# Patient Record
Sex: FEMALE | Race: WHITE | URBAN - METROPOLITAN AREA
[De-identification: names, ages, dates, MRNs, and addresses within clinical notes are randomized per-mention and may not be internally consistent; named-entity substitution may affect disease eponyms.]

---

## 2019-03-15 ENCOUNTER — TRANSFERRED RECORDS (OUTPATIENT)
Dept: HEALTH INFORMATION MANAGEMENT | Facility: CLINIC | Age: 49
End: 2019-03-15

## 2019-03-15 LAB
ALT SERPL-CCNC: 39 U/L (ref 13–69)
AST SERPL-CCNC: 25 U/L (ref 12–35)
CREAT SERPL-MCNC: 0.6 MG/DL (ref 0.5–1.5)
GFR SERPL CREATININE-BSD FRML MDRD: 104.3 ML/MIN
GLUCOSE SERPL-MCNC: 94 MG/DL (ref 60–115)
INR PPP: 0.9 (ref 0.9–1.1)
POTASSIUM SERPL-SCNC: 4.2 MMOL/L (ref 3.6–5.1)
TSH SERPL-ACNC: 3.85 UIU/ML (ref 0.27–4.2)

## 2019-03-19 ENCOUNTER — HOSPITAL ENCOUNTER (EMERGENCY)
Facility: CLINIC | Age: 49
Discharge: HOME | End: 2019-03-19
Attending: INTERNAL MEDICINE | Admitting: INTERNAL MEDICINE
Payer: COMMERCIAL

## 2019-03-19 VITALS
HEIGHT: 65 IN | WEIGHT: 226.85 LBS | RESPIRATION RATE: 16 BRPM | HEART RATE: 72 BPM | BODY MASS INDEX: 37.8 KG/M2 | OXYGEN SATURATION: 99 % | TEMPERATURE: 99 F | SYSTOLIC BLOOD PRESSURE: 126 MMHG | DIASTOLIC BLOOD PRESSURE: 71 MMHG

## 2019-03-19 DIAGNOSIS — R51.9 NONINTRACTABLE HEADACHE, UNSPECIFIED CHRONICITY PATTERN, UNSPECIFIED HEADACHE TYPE: ICD-10-CM

## 2019-03-19 DIAGNOSIS — R53.81 MALAISE: ICD-10-CM

## 2019-03-19 LAB
ALBUMIN SERPL-MCNC: 3.4 G/DL (ref 3.4–5)
ALBUMIN UR-MCNC: NEGATIVE MG/DL
ALP SERPL-CCNC: 148 U/L (ref 40–150)
ALT SERPL W P-5'-P-CCNC: 38 U/L (ref 0–50)
ANION GAP SERPL CALCULATED.3IONS-SCNC: 5 MMOL/L (ref 3–14)
APPEARANCE UR: CLEAR
AST SERPL W P-5'-P-CCNC: 33 U/L (ref 0–45)
BASOPHILS # BLD AUTO: 0 10E9/L (ref 0–0.2)
BASOPHILS NFR BLD AUTO: 0.3 %
BILIRUB SERPL-MCNC: 0.3 MG/DL (ref 0.2–1.3)
BILIRUB UR QL STRIP: NEGATIVE
BUN SERPL-MCNC: 11 MG/DL (ref 7–30)
CALCIUM SERPL-MCNC: 8.6 MG/DL (ref 8.5–10.1)
CHLORIDE SERPL-SCNC: 112 MMOL/L (ref 94–109)
CO2 SERPL-SCNC: 24 MMOL/L (ref 20–32)
COLOR UR AUTO: ABNORMAL
CREAT SERPL-MCNC: 0.73 MG/DL (ref 0.52–1.04)
DIFFERENTIAL METHOD BLD: NORMAL
EOSINOPHIL # BLD AUTO: 0.1 10E9/L (ref 0–0.7)
EOSINOPHIL NFR BLD AUTO: 0.7 %
ERYTHROCYTE [DISTWIDTH] IN BLOOD BY AUTOMATED COUNT: 12.8 % (ref 10–15)
GFR SERPL CREATININE-BSD FRML MDRD: >90 ML/MIN/{1.73_M2}
GLUCOSE SERPL-MCNC: 87 MG/DL (ref 70–99)
GLUCOSE UR STRIP-MCNC: NEGATIVE MG/DL
HCT VFR BLD AUTO: 41.8 % (ref 35–47)
HGB BLD-MCNC: 13.2 G/DL (ref 11.7–15.7)
HGB UR QL STRIP: NEGATIVE
IMM GRANULOCYTES # BLD: 0 10E9/L (ref 0–0.4)
IMM GRANULOCYTES NFR BLD: 0.3 %
INTERPRETATION ECG - MUSE: NORMAL
KETONES UR STRIP-MCNC: NEGATIVE MG/DL
LEUKOCYTE ESTERASE UR QL STRIP: NEGATIVE
LYMPHOCYTES # BLD AUTO: 1.6 10E9/L (ref 0.8–5.3)
LYMPHOCYTES NFR BLD AUTO: 24.2 %
MAGNESIUM SERPL-MCNC: 2.3 MG/DL (ref 1.6–2.3)
MCH RBC QN AUTO: 29.8 PG (ref 26.5–33)
MCHC RBC AUTO-ENTMCNC: 31.6 G/DL (ref 31.5–36.5)
MCV RBC AUTO: 94 FL (ref 78–100)
MONOCYTES # BLD AUTO: 0.4 10E9/L (ref 0–1.3)
MONOCYTES NFR BLD AUTO: 5.6 %
NEUTROPHILS # BLD AUTO: 4.7 10E9/L (ref 1.6–8.3)
NEUTROPHILS NFR BLD AUTO: 68.9 %
NITRATE UR QL: NEGATIVE
NRBC # BLD AUTO: 0 10*3/UL
NRBC BLD AUTO-RTO: 0 /100
PH UR STRIP: 6 PH (ref 5–7)
PLATELET # BLD AUTO: 370 10E9/L (ref 150–450)
POTASSIUM SERPL-SCNC: 4.2 MMOL/L (ref 3.4–5.3)
PROT SERPL-MCNC: 6.3 G/DL (ref 6.8–8.8)
RBC # BLD AUTO: 4.43 10E12/L (ref 3.8–5.2)
RBC #/AREA URNS AUTO: 0 /HPF (ref 0–2)
SODIUM SERPL-SCNC: 141 MMOL/L (ref 133–144)
SOURCE: ABNORMAL
SP GR UR STRIP: 1 (ref 1–1.03)
SQUAMOUS #/AREA URNS AUTO: 2 /HPF (ref 0–1)
UROBILINOGEN UR STRIP-MCNC: 0 MG/DL (ref 0–2)
WBC # BLD AUTO: 6.8 10E9/L (ref 4–11)
WBC #/AREA URNS AUTO: <1 /HPF (ref 0–5)

## 2019-03-19 PROCEDURE — 85025 COMPLETE CBC W/AUTO DIFF WBC: CPT | Performed by: INTERNAL MEDICINE

## 2019-03-19 PROCEDURE — 80053 COMPREHEN METABOLIC PANEL: CPT | Performed by: INTERNAL MEDICINE

## 2019-03-19 PROCEDURE — 93005 ELECTROCARDIOGRAM TRACING: CPT

## 2019-03-19 PROCEDURE — 36415 COLL VENOUS BLD VENIPUNCTURE: CPT | Performed by: INTERNAL MEDICINE

## 2019-03-19 PROCEDURE — 83735 ASSAY OF MAGNESIUM: CPT | Performed by: INTERNAL MEDICINE

## 2019-03-19 PROCEDURE — 81001 URINALYSIS AUTO W/SCOPE: CPT | Performed by: INTERNAL MEDICINE

## 2019-03-19 PROCEDURE — 99284 EMERGENCY DEPT VISIT MOD MDM: CPT

## 2019-03-19 PROCEDURE — 25000132 ZZH RX MED GY IP 250 OP 250 PS 637: Performed by: INTERNAL MEDICINE

## 2019-03-19 RX ORDER — ESCITALOPRAM OXALATE 20 MG/1
20 TABLET ORAL DAILY
COMMUNITY

## 2019-03-19 RX ORDER — DEXTROAMPHETAMINE SACCHARATE, AMPHETAMINE ASPARTATE MONOHYDRATE, DEXTROAMPHETAMINE SULFATE AND AMPHETAMINE SULFATE 7.5; 7.5; 7.5; 7.5 MG/1; MG/1; MG/1; MG/1
30 CAPSULE, EXTENDED RELEASE ORAL DAILY
COMMUNITY

## 2019-03-19 RX ORDER — ACETAMINOPHEN 325 MG/1
975 TABLET ORAL ONCE
Status: COMPLETED | OUTPATIENT
Start: 2019-03-19 | End: 2019-03-19

## 2019-03-19 RX ORDER — HYDROCODONE BITARTRATE AND ACETAMINOPHEN 5; 325 MG/1; MG/1
1 TABLET ORAL EVERY 6 HOURS PRN
COMMUNITY

## 2019-03-19 RX ORDER — LEVETIRACETAM 500 MG/1
500 TABLET ORAL 2 TIMES DAILY
COMMUNITY

## 2019-03-19 RX ADMIN — ACETAMINOPHEN 975 MG: 325 TABLET, FILM COATED ORAL at 14:28

## 2019-03-19 SDOH — HEALTH STABILITY: MENTAL HEALTH: HOW OFTEN DO YOU HAVE A DRINK CONTAINING ALCOHOL?: NEVER

## 2019-03-19 ASSESSMENT — ENCOUNTER SYMPTOMS
HEADACHES: 1
ABDOMINAL PAIN: 0
FREQUENCY: 0
DIFFICULTY URINATING: 0
FATIGUE: 1
WEAKNESS: 0
MYALGIAS: 1
HEMATURIA: 0
NUMBNESS: 0
DYSURIA: 0

## 2019-03-19 ASSESSMENT — MIFFLIN-ST. JEOR: SCORE: 1664.88

## 2019-03-19 NOTE — LETTER
March 19, 2019      To Whom It May Concern:      Elizabeth Antonio was seen in our Emergency Department today, 03/19/19.  I expect her condition to improve over the next 1-2 days.  She may return to work/school when improved.    Sincerely,        Mary Bravo RN

## 2019-03-19 NOTE — ED TRIAGE NOTES
Pt was found floor of a [employer] in [place] on Friday and was seen in ED in [place].  She has headache and generalized weakness.  She was told to come to [place] if worse due to other hospital not having ability to do EEG.

## 2019-03-19 NOTE — ED AVS SNAPSHOT
[facility] Emergency Department  [address]  [facility] [address]  Phone:  [phone]  Fax:  [phone]                                    Elizabeth Antonio   MRN: [id number]    Department:  [facility] Emergency Department   Date of Visit:  3/19/2019           After Visit Summary Signature Page    I have received my discharge instructions, and my questions have been answered. I have discussed any challenges I see with this plan with the nurse or doctor.    ..........................................................................................................................................  Patient/Patient Representative Signature      ..........................................................................................................................................  Patient Representative Print Name and Relationship to Patient    ..................................................               ................................................  Date                                   Time    ..........................................................................................................................................  Reviewed by Signature/Title    ...................................................              ..............................................  Date                                               Time          22EPIC Rev 08/18

## 2019-03-19 NOTE — ED PROVIDER NOTES
"  History     Chief Complaint:  Headache    The history is provided by the patient.      Elizabeth Antonio is a 49 year old female with a history of Migraines, asthma, and depression who presents to the emergency department with her mother for evaluation of a headache. Five days ago while at [employer], she remembers getting a cart and then next was found in an asile on the floor by an employee. She then remembers waking up in am ambulance, very confused as to what had happened. She remembers she lost control of her bladder, bit her tongue, being achy from the fall, and with left sided jaw pain. She was evaluated at the [facility], where they did a chest xray, head MRI, laboratory work, and EKG (results below). She was then referred to see a neurologist through the [facility], which is scheduled for tomorrow, and was started on 500 mg Keppra BID. Since then, the patient has continued to \"feel off\", not feeing herself, achy, fatigued, and complaining of a headache, prompting to her seek evaluation sooner than her appointment. She has been eating protein shakes because of her continued sore tongue and jaw pain. She continues to have back tenderness as well as arm and leg tenderness. She is without urinary symptoms, including hematuria, abdominal pain, and numbness and weakness. She denies history of previous seizures, head injuries, or frequent headaches.     Laboratory studies:  HCG: Negative  CBC: WBC: 11.22 (H), HGB: 13.5, PLT: 412  CMP: Sodium 138, potassium 4.2, chloride 114, BUN 19, AST 25, ALT 39,, o/w WNL (Creatinine: 0.80)  TSH: 3.8  Lipase: 218  Magnesium 2.3  Total bilirubin: 0.3  UA with micro: All negative. No white cells.  Urine drug tox positive for amphetamines  Acetaminophen level less than 10.  Alcohol ethyl: <0.01  INR: 0.9  Lactic acid: 0.9    Imaging:  CT Head without contrast:   No acute intracranial hemorrhage. As per radiology.    XR Chest 2 views:   Without significant fracture, " "cardiomegaly, or infiltrate. As per radiology.     MRI Brain:  Incidental findings on the left falx of possible calcified meningioma. No associated swelling with it. No recent CVA or hemorrhage noted. As per radiology.         Allergies:  Lisinopril     Medications:    Adderall  Lexapro  Keppra    Past Medical History:    Allergic rhinitis  Depression  Asthma  Insomnia  Migraines  TMJ  Morbid obesity    Past Surgical History:    cholecystectomy  Gyn surgery  Orthopedic surgery    Family History:    Breast cancer  Heart disease: Maternal grandmother  Asthma    Social History:  Negative for tobacco use.  Negative for alcohol use.  Negative for drug use.  Patient presents with her mother.     Review of Systems   Constitutional: Positive for fatigue.   Gastrointestinal: Negative for abdominal pain.   Genitourinary: Negative for difficulty urinating, dysuria, frequency, hematuria and urgency.   Musculoskeletal: Positive for myalgias.   Neurological: Positive for headaches. Negative for weakness and numbness.   All other systems reviewed and are negative.    Physical Exam     Patient Vitals for the past 24 hrs:   BP Temp Temp src Pulse Heart Rate Resp SpO2 Height Weight   03/19/19 1550 -- -- -- -- -- -- 99 % -- --   03/19/19 1549 126/71 -- -- 72 -- -- -- -- --   03/19/19 1326 (!) 148/97 99  F (37.2  C) Oral -- 82 16 96 % 1.651 m (5' 5\") 102.9 kg (226 lb 13.7 oz)     Physical Exam   Constitutional:   Pleasant and cooperative   HENT:   Right Ear: Tympanic membrane normal.   Left Ear: Tympanic membrane normal.   Mouth/Throat: Oropharynx is clear and moist and mucous membranes are normal. No posterior oropharyngeal erythema.   Bruise right tongue   Eyes: Conjunctivae are normal.   Neck: Neck supple.   Cardiovascular: Normal rate, regular rhythm and normal heart sounds.   Pulmonary/Chest: Effort normal and breath sounds normal.   Abdominal: Soft. Bowel sounds are normal. She exhibits no distension. There is no tenderness. " There is no rebound and no guarding.   Musculoskeletal: Normal range of motion.   Tender over back muscles   Neurological: She is alert.   Skin: Skin is warm and dry.   Psychiatric: She has a normal mood and affect.       Emergency Department Course   ECG:  Indication: headache  Time: 1450  Vent. Rate 66 bpm. AL interval 128. QRS duration 82. QT/QTc 386/404. P-R-T axis 50 -7 -2. Normal sinus rhythm. Normal ECG. Agrees with computer interpretation. Read time: 1500.    Laboratory:  UA with micro: Squamous epithelial 2 (H), o/w negative  Urine Culture: In process    CBC: WBC: 6.8, HGB: 13.2, PLT: 370  CMP: Chloride 112 (H), Protein total 6.3 (L), o/w WNL (Creatinine: 0.73)  Magnesium: 2.3    Interventions:  1428 Tylenol 975 mg tablet PO    Emergency Department Course:  1430 Nursing notes and vitals reviewed.  I performed an exam of the patient as documented above.     Medicine administered as documented above. Blood drawn. This was sent to the lab for further testing, results above.    EKG obtained in the ED, see results above.     The patient provided a urine sample here in the emergency department. This was sent for laboratory testing, findings above.     1535 I consulted with Dr. Chava Rowley of the [facility], Neurology, regarding the patient's history and presentation here in the emergency department.    1537 I rechecked the patient and discussed the results of her workup thus far.     Findings and plan explained to the Patient and mother. Patient discharged home with instructions regarding supportive care, medications, and reasons to return. The importance of close follow-up was reviewed.     I personally reviewed the laboratory results with the Patient and mother and answered all related questions prior to discharge.   Impression & Plan    Medical Decision Making:    Elizabeth Antonio is a 49 year old female who had a recent first time spell of loss of consciousness highly suspicious for seizure who presents now  with ongoing headache as well as multiple other nonspecific symptoms.  Evaluation here remains unremarkable with normal blood tests, normal EKG, normal vital signs.  As noted I discussed the case with neurology who felt that these symptoms were characteristic of medication side effects of Keppra.  He felt that since the patient was being seen tomorrow it would be most prudent to continue Keppra until that neurologist could express an opinion.  I have reassured the patient that we see no sign of more serious illness.  She should keep her appointment tomorrow, return if worse or new symptoms.    Critical Care time:  none    Diagnosis:    ICD-10-CM    1. Malaise R53.81    2. Nonintractable headache, unspecified chronicity pattern, unspecified headache type R51        Disposition:  discharged to home with her mother    Scribe Disclosure:  I, Sharon Rocco, am serving as a scribe on 3/19/2019 at 2:43 PM to personally document services performed by Lisa Gill MD based on my observations and the provider's statements to me.     Sharon Valiente  3/19/2019   [facility] EMERGENCY DEPARTMENT       Lisa Gill MD  03/19/19 9490

## 2019-03-19 NOTE — DISCHARGE INSTRUCTIONS
Discharge Instructions  First Time Seizure (Convulsion)    You have had a spell that may have been a seizure. Many other things can look like a seizure, including a fainting spell, a migraine, psychological issues, and other movement disorders. It can often be hard to know with certainty whether you had a seizure. Your evaluation today may not be complete and you may need further testing and evaluation. You need to follow-up with your regular doctor within 3 days. You will often need to be scheduled for an EEG to monitor your brain waves.    Of people who have a seizure, most never have another one. For that reason, anti-seizure medicines are not started in most cases after a first seizure. If you have risk factors for seizures, medication may be started after a first seizure. People are not usually kept in the hospital after a seizure.    During a seizure, there is abnormal and excessive electrical activity in the brain. This can cause changes in awareness, behavior, and abnormal shaking or jerking movements.  This activity usually lasts only a few seconds to minutes. The period following a seizure is called the postictal state. During this time, you may be confused, tired, and you may develop a throbbing headache. Some people develop a brief period of difficulty speaking or experience temporary vision loss, numbness, or weakness.    Return to the Emergency Department if:   You have another seizure.  You develop a fever over 101 degrees.  You feel much more ill, or develop new symptoms like severe headache.  You have severe nausea or vomiting.  You have trouble walking, seeing, or develop weakness or numbness in your arms or legs.     What can I do to help myself?  Do not drive until you have been rechecked by your doctor and have been told it is safe to drive.  If you have a seizure while driving you may cause a motor vehicle accident with injury or death to yourself or others.   Do not swim, climb ladders, or do  anything else that would be dangerous if you had another seizure or spell of loss of consciousness, until you are cleared by your doctor.    Check your state driving requirements for patients with seizures on the [employer]ile[employer] Website at www.epilepsyfoundation.org/resources/drivingandtravel.cfm.  Do not drink alcohol.  Drinking alcohol increases the risk of seizures and can interfere with the effect of anti-seizure medications.  Take any medication prescribed for you exactly as directed, at the right times, and at the right doses.    If you were given a prescription for medicine here today, be sure to read all of the information (including the package insert) that comes with your prescription.  This will include important information about the medicine, its side effects, and any warnings that you need to know about.  The pharmacist who fills the prescription can provide more information and answer questions you may have about the medicine.  If you have questions or concerns that the pharmacist cannot address, please call or return to the Emergency Department.     Opioid Medication Information    Pain medications are among the most commonly prescribed medicines, so we are including this information for all our patients. If you did not receive pain medication or get a prescription for pain medicine, you can ignore it.     You may have been given a prescription for an opioid (narcotic) pain medicine and/or have received a pain medicine while here in the Emergency Department. These medicines can make you drowsy or impaired. You must not drive, operate dangerous equipment, or engage in any other dangerous activities while taking these medications. If you drive while taking these medications, you could be arrested for DUI, or driving under the influence. Do not drink any alcohol while you are taking these medications.     Opioid pain medications can cause addiction. If you have a history of chemical dependency of  any type, you are at a higher risk of becoming addicted to pain medications.  Only take these prescribed medications to treat your pain when all other options have been tried. Take it for as short a time and as few doses as possible. Store your pain pills in a secure place, as they are frequently stolen and provide a dangerous opportunity for children or visitors in your house to start abusing these powerful medications. We will not replace any lost or stolen medicine.  As soon as your pain is better, you should flush all your remaining medication.     Many prescription pain medications contain Tylenol  (acetaminophen), including Vicodin , Tylenol #3 , Norco , Lortab , and Percocet .  You should not take any extra pills of Tylenol  if you are using these prescription medications or you can get very sick.  Do not ever take more than 3000 mg of acetaminophen in any 24 hour period.    All opioids tend to cause constipation. Drink plenty of water and eat foods that have a lot of fiber, such as fruits, vegetables, prune juice, apple juice and high fiber cereal.  Take a laxative if you don?t move your bowels at least every other day. Miralax , Milk of Magnesia, Colace , or Senna  can be used to keep you regular.      Remember that you can always come back to the Emergency Department if you are not able to see your regular doctor in the amount of time listed above, if you get any new symptoms, or if there is anything that worries you.